# Patient Record
Sex: FEMALE | Race: WHITE | Employment: FULL TIME | URBAN - METROPOLITAN AREA
[De-identification: names, ages, dates, MRNs, and addresses within clinical notes are randomized per-mention and may not be internally consistent; named-entity substitution may affect disease eponyms.]

---

## 2020-01-10 ENCOUNTER — HOSPITAL ENCOUNTER (OUTPATIENT)
Age: 45
Discharge: HOME | End: 2020-01-12
Payer: COMMERCIAL

## 2020-01-10 ENCOUNTER — HOSPITAL ENCOUNTER (OUTPATIENT)
Dept: ULTRASOUND IMAGING | Age: 45
Discharge: HOME | End: 2020-01-12
Payer: COMMERCIAL

## 2020-01-10 PROCEDURE — 76775 US EXAM ABDO BACK WALL LIM: CPT

## 2021-01-05 ENCOUNTER — HOSPITAL ENCOUNTER (OUTPATIENT)
Age: 46
Discharge: HOME | End: 2021-01-07
Payer: COMMERCIAL

## 2021-01-05 ENCOUNTER — HOSPITAL ENCOUNTER (OUTPATIENT)
Dept: GENERAL RADIOLOGY | Age: 46
Discharge: HOME | End: 2021-01-07
Payer: COMMERCIAL

## 2021-01-05 DIAGNOSIS — M70.61 GREATER TROCHANTERIC BURSITIS OF BOTH HIPS: ICD-10-CM

## 2021-01-05 DIAGNOSIS — M70.62 GREATER TROCHANTERIC BURSITIS OF BOTH HIPS: ICD-10-CM

## 2021-01-05 PROCEDURE — 73521 X-RAY EXAM HIPS BI 2 VIEWS: CPT

## 2021-03-23 ENCOUNTER — HOSPITAL ENCOUNTER (OUTPATIENT)
Age: 46
Discharge: HOME | End: 2021-03-25

## 2021-03-23 LAB
ANION GAP SERPL CALCULATED.3IONS-SCNC: 9 MMOL/L (ref 7–16)
BUN BLDV-MCNC: 6 MG/DL (ref 6–20)
CALCIUM SERPL-MCNC: 8.4 MG/DL (ref 8.6–10.2)
CHLORIDE BLD-SCNC: 102 MMOL/L (ref 98–107)
CO2: 25 MMOL/L (ref 22–29)
CREAT SERPL-MCNC: 0.6 MG/DL (ref 0.5–1)
GFR AFRICAN AMERICAN: >60
GFR NON-AFRICAN AMERICAN: >60 ML/MIN/1.73
GLUCOSE BLD-MCNC: 114 MG/DL (ref 74–99)
HCT VFR BLD CALC: 36.6 % (ref 34–48)
HEMOGLOBIN: 11.8 G/DL (ref 11.5–15.5)
MCH RBC QN AUTO: 29.8 PG (ref 26–35)
MCHC RBC AUTO-ENTMCNC: 32.2 % (ref 32–34.5)
MCV RBC AUTO: 92.4 FL (ref 80–99.9)
PDW BLD-RTO: 12.1 FL (ref 11.5–15)
PLATELET # BLD: 167 E9/L (ref 130–450)
PMV BLD AUTO: 11.5 FL (ref 7–12)
POTASSIUM SERPL-SCNC: 3.6 MMOL/L (ref 3.5–5)
RBC # BLD: 3.96 E12/L (ref 3.5–5.5)
SODIUM BLD-SCNC: 136 MMOL/L (ref 132–146)
WBC # BLD: 5.6 E9/L (ref 4.5–11.5)

## 2021-03-23 PROCEDURE — 85027 COMPLETE CBC AUTOMATED: CPT

## 2021-03-23 PROCEDURE — 80048 BASIC METABOLIC PNL TOTAL CA: CPT

## 2022-04-05 ENCOUNTER — HOSPITAL ENCOUNTER (OUTPATIENT)
Dept: GENERAL RADIOLOGY | Age: 47
Discharge: HOME | End: 2022-04-07
Payer: COMMERCIAL

## 2022-04-05 ENCOUNTER — HOSPITAL ENCOUNTER (OUTPATIENT)
Age: 47
Discharge: HOME | End: 2022-04-07
Payer: COMMERCIAL

## 2022-04-05 DIAGNOSIS — M25.562 ARTHRALGIA OF BOTH LOWER LEGS: ICD-10-CM

## 2022-04-05 DIAGNOSIS — M25.561 ARTHRALGIA OF BOTH LOWER LEGS: ICD-10-CM

## 2022-04-05 PROCEDURE — 73564 X-RAY EXAM KNEE 4 OR MORE: CPT

## 2023-01-17 ENCOUNTER — TELEPHONE (OUTPATIENT)
Dept: ADMINISTRATIVE | Age: 48
End: 2023-01-17

## 2023-02-02 ENCOUNTER — OFFICE VISIT (OUTPATIENT)
Dept: CARDIOLOGY CLINIC | Age: 48
End: 2023-02-02
Payer: COMMERCIAL

## 2023-02-02 VITALS
WEIGHT: 196.9 LBS | HEART RATE: 86 BPM | BODY MASS INDEX: 29.16 KG/M2 | HEIGHT: 69 IN | DIASTOLIC BLOOD PRESSURE: 70 MMHG | SYSTOLIC BLOOD PRESSURE: 112 MMHG

## 2023-02-02 DIAGNOSIS — R06.09 DOE (DYSPNEA ON EXERTION): ICD-10-CM

## 2023-02-02 DIAGNOSIS — R07.89 OTHER CHEST PAIN: ICD-10-CM

## 2023-02-02 DIAGNOSIS — I51.9 HEART PROBLEM: Primary | ICD-10-CM

## 2023-02-02 PROCEDURE — 93000 ELECTROCARDIOGRAM COMPLETE: CPT | Performed by: INTERNAL MEDICINE

## 2023-02-02 PROCEDURE — 99244 OFF/OP CNSLTJ NEW/EST MOD 40: CPT | Performed by: INTERNAL MEDICINE

## 2023-02-02 RX ORDER — LISINOPRIL 5 MG/1
TABLET ORAL
COMMUNITY
Start: 2023-01-11

## 2023-02-02 RX ORDER — ECHINACEA PURPUREA EXTRACT 125 MG
1 TABLET ORAL PRN
COMMUNITY
Start: 2020-01-27

## 2023-02-02 RX ORDER — ALPRAZOLAM 0.25 MG/1
TABLET ORAL
COMMUNITY
Start: 2023-01-11

## 2023-02-02 NOTE — PROGRESS NOTES
Ijeoma Rios  1975  Date of Service: 2/2/2023    Reason for Consultation: We were asked to see Ijeoma Rios by Dr. Gladis Gardner DO  regarding chest pain. History of Chief Complaint: This is a 50 y.o. female with a history of hypertension since August 2022. She also has a history of irritable bowel syndrome and hysterectomy. She states that in August she was found to have significant increase in her blood pressure. Her blood pressure has been elevated ever since. She does state that she has been under a lot of emotional stress lately. She also states that she will have an occasional chest discomfort. She describes this as a sharp pain that is with inspiration. She also recently has been having heaviness or tightness in her chest.  These are not necessarily associated with any physical activities. She then states that she is more winded with activities that she used to be. She denies any orthopnea/PND. She denies any palpitations, or syncope, or near syncope. REVIEW OF SYSTEMS:  As above. See patient questionair for further review of systems. CURRENT MEDICATIONS:  Current Outpatient Medications   Medication Sig Dispense Refill    lisinopril (PRINIVIL;ZESTRIL) 5 MG tablet TAKE ONE TABLET BY MOUTH once DAILY      Probiotic Product (UP4 PROBIOTICS ADULT PO) Take by mouth      ALPRAZolam (XANAX) 0.25 MG tablet TAKE ONE TABLET BY MOUTH TWICE a day AS NEEDED for anxiety      sodium chloride (OCEAN) 0.65 % nasal spray 1 spray by Nasal route as needed      diphenhydrAMINE-APAP, sleep, (TYLENOL PM EXTRA STRENGTH PO) Take by mouth       No current facility-administered medications for this visit. ALLERGIES:  Allergies   Allergen Reactions    Cefdinir      Other reaction(s): GI Intolerance  Immediate diarrhea       MEDICAL HISTORY:  No past medical history on file.     SURGICAL HISTORY:  Past Surgical History:   Procedure Laterality Date    ABDOMINAL SURGERY      abdominoplasty BREAST REDUCTION SURGERY      BREAST REDUCTION SURGERY       SECTION      ENDOMETRIAL ABLATION         FAMILY HISTORY:  No family history on file. SOCIAL HISTORY:  Social History     Socioeconomic History    Marital status:    Tobacco Use    Smoking status: Never    Smokeless tobacco: Never   Substance and Sexual Activity    Alcohol use: No    Drug use: No       PHYSICAL EXAM:  Vitals:    23 1120   BP: 112/70   Pulse: 86   Weight: 196 lb 14.4 oz (89.3 kg)   Height: 5' 9\" (1.753 m)       GENERAL:  He is alert and oriented x 3, communicates well, in no distress. NECK:  No masses, trachea is mid position. Supple, full ROM, no JVD or bruits. No palpable thyromegaly or lymphadenopathy. HEART:  Regular rate and rhythm. Normal S1 and S2. There are no abnormal murmurs or gallops. No heaves. LUNGS:  Clear to auscultation bilaterally. No use of accessory muscles. ABDOMEN:  Soft, non-tender. Normal bowel sounds. EXTREMITIES:  Full ROM x4. No bilateral lower extremity edema. Good distal pulses. EYES:  PERRL, normal lids & conjunctiva. No icterus. ENT: no external masses, no bleeding. Moist mucosa. Normal lips formation. NEURO: Full ROM x 4, EOMI, no tremors. SKIN:  Warm, dry and intact. Normal turgor, no petechia. Phych: alert & oriented x3. Normal  Judgement & insight. Currently not agitated or anxious. EKG: Sinus rhythm, 86 bpm, nl axis, nonspecific ST - T wave changes. Assessment:   Atypical chest discomfort as described above. Windedness/dyspnea on exertion. Hypertension. Well-controlled today. Irritable bowel syndrome      Recommendations:  Exercise Cardiolite stress test  Echocardiogram.    Thank you for allowing me to participate in your patient's care. [address] [facility] [place], [address] [facility]  Interventional Cardiology    Note: This report was completed using computerized voice recognition software.  Every effort has been made to ensure accuracy, however; and invert and computerized transcription errors may be present.

## 2023-02-03 ENCOUNTER — TELEPHONE (OUTPATIENT)
Dept: CARDIOLOGY | Age: 48
End: 2023-02-03

## 2023-02-10 ENCOUNTER — TELEPHONE (OUTPATIENT)
Dept: CARDIOLOGY | Age: 48
End: 2023-02-10

## 2023-02-10 NOTE — TELEPHONE ENCOUNTER
Spoke with patient and confirmed Nuclear stress test appointment on February 14, 2023 at 0700. Instructions for test,, and COVID-19 preprocedure information reviewed with patient, questions answered. Patient verbalized understanding.

## 2023-02-13 ENCOUNTER — TELEPHONE (OUTPATIENT)
Dept: CARDIOLOGY | Age: 48
End: 2023-02-13

## 2023-02-13 DIAGNOSIS — R06.09 DOE (DYSPNEA ON EXERTION): ICD-10-CM

## 2023-02-13 DIAGNOSIS — R07.89 OTHER CHEST PAIN: Primary | ICD-10-CM

## 2023-02-13 NOTE — TELEPHONE ENCOUNTER
Patient is scheduled for a nuclear stress test and echo on 2/14. [facility] approved echo, but not the stress test. [facility] can be reached at [phone], order [id number]. Please advise. Thank you.

## 2023-02-13 NOTE — TELEPHONE ENCOUNTER
SPOKE WITH PATIENT TO CANCEL NUC STRESS THAT WAS  SCHEDULED FOR 02-14-23 NUC ST WAS DENIED. DR. Carolyn Snyder HAD ORDERED AN TREADMILL STRESS TEST INSTEAD.     Electronically signed by Larry Jerome on 2/13/2023 at 12:35 PM

## 2023-02-14 ENCOUNTER — HOSPITAL ENCOUNTER (OUTPATIENT)
Dept: CARDIOLOGY | Age: 48
End: 2023-02-14
Payer: COMMERCIAL

## 2023-02-14 ENCOUNTER — APPOINTMENT (OUTPATIENT)
Dept: CARDIOLOGY | Age: 48
End: 2023-02-14
Payer: COMMERCIAL

## 2023-02-14 ENCOUNTER — HOSPITAL ENCOUNTER (OUTPATIENT)
Dept: CARDIOLOGY | Age: 48
Discharge: HOME | End: 2023-02-14
Payer: COMMERCIAL

## 2023-02-14 VITALS
HEIGHT: 69 IN | WEIGHT: 190 LBS | HEART RATE: 72 BPM | RESPIRATION RATE: 16 BRPM | DIASTOLIC BLOOD PRESSURE: 76 MMHG | SYSTOLIC BLOOD PRESSURE: 110 MMHG | BODY MASS INDEX: 28.14 KG/M2

## 2023-02-14 DIAGNOSIS — R06.09 DOE (DYSPNEA ON EXERTION): ICD-10-CM

## 2023-02-14 DIAGNOSIS — R07.89 OTHER CHEST PAIN: ICD-10-CM

## 2023-02-14 LAB
LV EF: 60 %
LVEF MODALITY: NORMAL

## 2023-02-14 PROCEDURE — 93017 CV STRESS TEST TRACING ONLY: CPT

## 2023-02-14 PROCEDURE — 93306 TTE W/DOPPLER COMPLETE: CPT

## 2023-02-14 RX ORDER — PANTOPRAZOLE SODIUM 40 MG/1
TABLET, DELAYED RELEASE ORAL PRN
COMMUNITY
Start: 2023-02-07

## 2023-02-14 NOTE — PROCEDURES
[id number] [facility] [address] and Vascular Lab - [place] [phone]    Exercise Stress Study (non-nuclear)      Name: Marsha Carrera Account Number:  [de-identified]      :  1975          Sex: female         Date of Study:  2023    Height: 5' 9\" (175.3 cm)          Weight: 190 lb (86.2 kg)    Ordering Provider: Nola Pearl DO          PCP: Jo Greer DO    Cardiologist: Nola Pearl DO              Interpreting Physician: Lucy Paulino MD    Indication:   Detecting the presence and location of coronary artery disease    Clinical History:   Patient has no known history of coronary artery disease. Resting ECG:    Sinus rhythm, 74 bpm and nonspecific ST-T wave changes. Exercise: The patient exercised using a Maxwell protocol, completing 8:00 minutes and reaching an estimated work load of 86.7 metabolic equivalents (METS). Resting HR was 74. Peak exercise heart rate was 153 ( 89% of maximum predicted heart rate for age). Baseline /76. Peak exercise /88. The blood pressure response to exercise was normal      Exercise was terminated due to dyspnea. The patient experienced no chest pain with exercise. Pulse oximetry was used to monitor oxygen saturation during the stress test.  The study was performed on Room Air. The resting pulse oximeter was 93%. The lowest O2 saturation seen during exercise was 97 %. The average O2 saturation with exercise was 97 %. Exercise ECG:   The patient demonstrated  rare PVC  during exercise. With exercise, there were no ST segment changes of significance at the heart rate achieved. Franco treadmill score was 8 implying low risk. Impression:    Exercise EKG was negative. The patient experienced no chest pain with exercise. Franco treadmill score was 8 implying low risk. Exercise capacity was average.     Low risk general exercise treadmill test.    Thank you for sending your patient to this [employer].     Electronically signed by Cha Solano MD on 2/14/23 at 2:42 PM EST

## 2023-02-15 ENCOUNTER — TELEPHONE (OUTPATIENT)
Dept: CARDIOLOGY CLINIC | Age: 48
End: 2023-02-15

## 2023-02-15 NOTE — TELEPHONE ENCOUNTER
----- Message from Rea Werner, DO sent at 2/15/2023  4:02 PM EST -----  Let her know that her heart function and valves are good. Yesterday her stress test was good as well. I do not think that her symptoms are coming from her heart based on these tests. No further cardiac testing is needed. Okay to follow-up with cardiology as needed.

## 2023-02-15 NOTE — TELEPHONE ENCOUNTER
----- Message from Ric Guzman, DO sent at 2/14/2023  4:34 PM EST -----  Let her know that the stress test was good.  Echo is pending.

## 2023-09-26 ENCOUNTER — HOSPITAL ENCOUNTER (OUTPATIENT)
Age: 48
Discharge: HOME | End: 2023-09-28
Payer: COMMERCIAL

## 2023-09-26 LAB
ANION GAP SERPL CALCULATED.3IONS-SCNC: 10 MMOL/L (ref 7–16)
BUN SERPL-MCNC: 7 MG/DL (ref 6–20)
CALCIUM SERPL-MCNC: 8.9 MG/DL (ref 8.6–10.2)
CHLORIDE SERPL-SCNC: 102 MMOL/L (ref 98–107)
CO2 SERPL-SCNC: 23 MMOL/L (ref 22–29)
CREAT SERPL-MCNC: 0.7 MG/DL (ref 0.5–1)
ERYTHROCYTE [DISTWIDTH] IN BLOOD BY AUTOMATED COUNT: 11.9 % (ref 11.5–15)
GFR SERPL CREATININE-BSD FRML MDRD: >60 ML/MIN/1.73M2
GLUCOSE SERPL-MCNC: 110 MG/DL (ref 74–99)
HCT VFR BLD AUTO: 37.1 % (ref 34–48)
HGB BLD-MCNC: 12.2 G/DL (ref 11.5–15.5)
MCH RBC QN AUTO: 29.5 PG (ref 26–35)
MCHC RBC AUTO-ENTMCNC: 32.9 G/DL (ref 32–34.5)
MCV RBC AUTO: 89.6 FL (ref 80–99.9)
PLATELET # BLD AUTO: 214 K/UL (ref 130–450)
PMV BLD AUTO: 10.9 FL (ref 7–12)
POTASSIUM SERPL-SCNC: 4.1 MMOL/L (ref 3.5–5)
RBC # BLD AUTO: 4.14 M/UL (ref 3.5–5.5)
SODIUM SERPL-SCNC: 135 MMOL/L (ref 132–146)
WBC OTHER # BLD: 10.3 K/UL (ref 4.5–11.5)

## 2023-09-26 PROCEDURE — 80048 BASIC METABOLIC PNL TOTAL CA: CPT

## 2023-09-26 PROCEDURE — 85027 COMPLETE CBC AUTOMATED: CPT

## 2024-01-24 ENCOUNTER — OFFICE VISIT (OUTPATIENT)
Dept: BARIATRICS/WEIGHT MGMT | Age: 49
End: 2024-01-24
Payer: COMMERCIAL

## 2024-01-24 VITALS
SYSTOLIC BLOOD PRESSURE: 128 MMHG | TEMPERATURE: 98.4 F | HEIGHT: 69 IN | DIASTOLIC BLOOD PRESSURE: 86 MMHG | WEIGHT: 205.6 LBS | BODY MASS INDEX: 30.45 KG/M2 | HEART RATE: 76 BPM

## 2024-01-24 DIAGNOSIS — E66.09 CLASS 1 OBESITY DUE TO EXCESS CALORIES WITHOUT SERIOUS COMORBIDITY WITH BODY MASS INDEX (BMI) OF 30.0 TO 30.9 IN ADULT: ICD-10-CM

## 2024-01-24 DIAGNOSIS — I10 ESSENTIAL HYPERTENSION: Primary | ICD-10-CM

## 2024-01-24 PROCEDURE — 3074F SYST BP LT 130 MM HG: CPT | Performed by: INTERNAL MEDICINE

## 2024-01-24 PROCEDURE — 3079F DIAST BP 80-89 MM HG: CPT | Performed by: INTERNAL MEDICINE

## 2024-01-24 PROCEDURE — 99204 OFFICE O/P NEW MOD 45 MIN: CPT | Performed by: INTERNAL MEDICINE

## 2024-01-24 RX ORDER — ESCITALOPRAM OXALATE 10 MG/1
10 TABLET ORAL DAILY
COMMUNITY
Start: 2024-01-08

## 2024-01-24 RX ORDER — LOSARTAN POTASSIUM 50 MG/1
50 TABLET ORAL DAILY
COMMUNITY

## 2024-01-24 NOTE — PROGRESS NOTES
CC -   HTN, Obesity    BACKGROUND -   First visit: 22    Obesity (all weight in lbs)  Initial Ht 69\", Wt 205.6,  BMI 30.36  Began about 1.5 yrs ago  HS Grad wt 130   Lowest   wt 130 (usually 150)  Highest  wt 215 (about a month ago)  Pattern of wt gain: rapid in the last 1.5 yrs after stopping keto diet  Wt change past yr: +50 lbs  Most wt lost: 20-25  Other diets attempted: tried keto diet for years. Had hip injury and needed tendon repair - second on was in     Initial Diet:    Number of meals per day - 2 (low carb toast to work with hot tea, lunch is something like chick-husam-a)    Number of snacks per day - 2 (popsicles)    Meal volume - 12\" plate - smt cereal in a bowl,  no seconds    Fast food/convenience store - 7x/week    Restaurants (not fast food) - ---x/week (included)   Sweets - 0d/week - sugar free   Chips - 3-4d/week (chips and salsa)   Crackers/pretzels - 0-1d/week   Nuts - 0d/week   Peanut Butter - 1d/week    Popcorn - 0d/week   Dried fruit - 0d/week   Whole fruit - 2d/week   Breakfast cereal - 2d/week (almond milk unsweetened)   Granola/Protein/Energy bar - 0d/week   Sugar sweetened beverages - popsicles daily (40 Roosevelt/10g Carb) for the last 2 months, diet pop/soda, no fruit juice, no coffee, hot tea with puria 1 cup daily, no energy drinks   Protein - No supplements   Fiber - No supplements (takes probiotic capsules)    Wt effect of HR foods = 2100 Roosevelt/wk = 300 Roosevelt/d= 13% DEN = 31 lb/year.     Initial Exercise:    Gym membership - Was in crutches for 3 months, planning to restart gym    Walking - no    Running - no    Resistance - no    Aerobic class - PT - at home 3d/wk     Sleep: 7hrs/night. Does not feel very rested. No daytime naps.  ______________________    PFSH -  No past medical history on file.  Past Surgical History:   Procedure Laterality Date    ABDOMEN SURGERY      abdominoplasty    BREAST REDUCTION SURGERY      BREAST REDUCTION SURGERY       SECTION

## 2024-01-24 NOTE — PATIENT INSTRUCTIONS
Rules:  Limit sweets to one day per month  Limit chips/crackers/pretzels/nuts/popcorn to 150 roosevelt/day  Eliminate all sugar sweetened beverages (including fruit juice)  Limit restaurants (including fast food and food from a convenience store) to one time every two weeks while in town    Requirements:  Make sure protein intake is at least 90 grams per day (do not count protein every day; instead spot check your intake every 2-3 weeks and make sure what you think you are getting is close to accurate; consider using a protein shake if needed; these are in the pharmacy section of the stores, not the grocery section; Premier, Pure Protein and Fairlife are relatively inexpensive and taste good to most patients; other options are Nectar, Boost Max, Ensure Max, BeneProtein and GNC lean (which is lactose-free);   Nectar fruit, Premier Protein Clear, IsoPure Protein Drink, and Protein 2 O are water-based options; Quest (or Cosco, which is cheaper and is ordered on Amazon) and the Mention Mobile 1 protein bars can also be used, but have less protein in them ) (<200 Roosevelt, >25 g protein) - (chicken, fish, turkey, egg white)  (Disclaimer: Dietary supplements rarely have their listed ingredients and the amount of each verified by a third party other. Sometimes they give verification for their claims to be GMO and gluten free and to be organic. However, even such verifications as these may still be untrustworthy.)  Make sure that fiber intake is at least 25 grams per day. Do this in part or whole by taking 12 tablespoons of Fiber one original cereal or Armida's All Bran Buds cereal, or 4 tablespoons of wheat dextrin powder (Benefiber or generic brand); for both of these, start with 1/8th - 1/4th the target amount and every week add another 1/8th - 1/4th until reaching the target).  Also, fiber gummies containing inulin (such as Nature Mad, Allen, Benefiber) or Fiber Choice Pre-biotic tablets containing inulin are also an option.1 cup of